# Patient Record
Sex: FEMALE | Race: AMERICAN INDIAN OR ALASKA NATIVE | HISPANIC OR LATINO
[De-identification: names, ages, dates, MRNs, and addresses within clinical notes are randomized per-mention and may not be internally consistent; named-entity substitution may affect disease eponyms.]

---
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
## 2023-04-13 ENCOUNTER — TELEPHONE (OUTPATIENT)
Dept: RHEUMATOLOGY | Facility: CLINIC | Age: 23
End: 2023-04-13

## 2023-04-13 NOTE — TELEPHONE ENCOUNTER
Patient called that to get medical records from [facility] office,  needs to send cover with patient's name and date of birth for request. Her and he sister have appt 4/21    Fax: [phone]

## 2023-04-17 ENCOUNTER — MED REC SCAN ONLY (OUTPATIENT)
Dept: RHEUMATOLOGY | Facility: CLINIC | Age: 23
End: 2023-04-17

## 2023-04-21 ENCOUNTER — OFFICE VISIT (OUTPATIENT)
Dept: RHEUMATOLOGY | Facility: CLINIC | Age: 23
End: 2023-04-21

## 2023-04-21 VITALS
RESPIRATION RATE: 16 BRPM | HEIGHT: 60 IN | BODY MASS INDEX: 30.9 KG/M2 | SYSTOLIC BLOOD PRESSURE: 125 MMHG | WEIGHT: 157.38 LBS | DIASTOLIC BLOOD PRESSURE: 86 MMHG | HEART RATE: 91 BPM

## 2023-04-21 DIAGNOSIS — J98.4 RESTRICTIVE LUNG DISEASE: ICD-10-CM

## 2023-04-21 DIAGNOSIS — Z51.81 THERAPEUTIC DRUG MONITORING: ICD-10-CM

## 2023-04-21 DIAGNOSIS — M32.9 SYSTEMIC LUPUS ERYTHEMATOSUS, UNSPECIFIED SLE TYPE, UNSPECIFIED ORGAN INVOLVEMENT STATUS (HCC): Primary | ICD-10-CM

## 2023-04-21 PROCEDURE — 99244 OFF/OP CNSLTJ NEW/EST MOD 40: CPT | Performed by: INTERNAL MEDICINE

## 2023-04-21 PROCEDURE — 3079F DIAST BP 80-89 MM HG: CPT | Performed by: INTERNAL MEDICINE

## 2023-04-21 PROCEDURE — 3008F BODY MASS INDEX DOCD: CPT | Performed by: INTERNAL MEDICINE

## 2023-04-21 PROCEDURE — 3074F SYST BP LT 130 MM HG: CPT | Performed by: INTERNAL MEDICINE

## 2023-04-21 RX ORDER — MYCOPHENOLATE MOFETIL 500 MG/1
1000 TABLET ORAL 2 TIMES DAILY
Qty: 120 TABLET | Refills: 3 | Status: SHIPPED | OUTPATIENT
Start: 2023-04-21

## 2023-04-21 NOTE — PATIENT INSTRUCTIONS
1. Check labs  2. Cont. hydroxychlroquine 300mg a day   3. Cont.m ycophenolate 1000mg twice a day   4. Cont. Prednisone. 2.5mg a day   5. Check spirometry   6. Eye exam 9/2023   7.  Return to clinic 1 month

## 2023-06-16 ENCOUNTER — OFFICE VISIT (OUTPATIENT)
Dept: RHEUMATOLOGY | Facility: CLINIC | Age: 23
End: 2023-06-16

## 2023-06-16 VITALS
DIASTOLIC BLOOD PRESSURE: 84 MMHG | SYSTOLIC BLOOD PRESSURE: 133 MMHG | HEART RATE: 101 BPM | BODY MASS INDEX: 31.45 KG/M2 | HEIGHT: 60 IN | RESPIRATION RATE: 16 BRPM | WEIGHT: 160.19 LBS

## 2023-06-16 DIAGNOSIS — Z51.81 THERAPEUTIC DRUG MONITORING: ICD-10-CM

## 2023-06-16 DIAGNOSIS — E55.9 VITAMIN D DEFICIENCY: ICD-10-CM

## 2023-06-16 DIAGNOSIS — M32.9 SYSTEMIC LUPUS ERYTHEMATOSUS, UNSPECIFIED SLE TYPE, UNSPECIFIED ORGAN INVOLVEMENT STATUS (HCC): Primary | ICD-10-CM

## 2023-06-16 PROCEDURE — 99214 OFFICE O/P EST MOD 30 MIN: CPT | Performed by: INTERNAL MEDICINE

## 2023-06-16 PROCEDURE — 3079F DIAST BP 80-89 MM HG: CPT | Performed by: INTERNAL MEDICINE

## 2023-06-16 PROCEDURE — 3008F BODY MASS INDEX DOCD: CPT | Performed by: INTERNAL MEDICINE

## 2023-06-16 PROCEDURE — 3075F SYST BP GE 130 - 139MM HG: CPT | Performed by: INTERNAL MEDICINE

## 2023-06-16 RX ORDER — HYDROXYCHLOROQUINE SULFATE 200 MG/1
200 TABLET, FILM COATED ORAL 2 TIMES DAILY
Qty: 180 TABLET | Refills: 1 | Status: SHIPPED | OUTPATIENT
Start: 2023-06-16

## 2023-06-16 RX ORDER — PREDNISONE 2.5 MG/1
5 TABLET ORAL DAILY
Qty: 180 TABLET | Refills: 1 | Status: SHIPPED | OUTPATIENT
Start: 2023-06-16

## 2023-06-16 RX ORDER — MYCOPHENOLATE MOFETIL 500 MG/1
TABLET ORAL
Qty: 450 TABLET | Refills: 1 | Status: SHIPPED | OUTPATIENT
Start: 2023-06-16

## 2023-06-16 NOTE — PATIENT INSTRUCTIONS
1. Check labs in 2 months -   2. Cont. hydroxychlroquine 300mg a day   3. increase. mycophenolate to 1500mg in am and 1000mg in am   4. increase Prednisone. 2.5 to 5mg a day x 1 month   5. Check spirometry  - go ahead and schedule it. 6. Eye exam 9/2023   7.  Return to clinic 2  month

## 2023-08-28 ENCOUNTER — OFFICE VISIT (OUTPATIENT)
Dept: RHEUMATOLOGY | Facility: CLINIC | Age: 23
End: 2023-08-28

## 2023-08-28 VITALS
WEIGHT: 153 LBS | BODY MASS INDEX: 30.04 KG/M2 | SYSTOLIC BLOOD PRESSURE: 135 MMHG | HEART RATE: 98 BPM | RESPIRATION RATE: 16 BRPM | DIASTOLIC BLOOD PRESSURE: 94 MMHG | HEIGHT: 60 IN

## 2023-08-28 DIAGNOSIS — Z51.81 THERAPEUTIC DRUG MONITORING: ICD-10-CM

## 2023-08-28 DIAGNOSIS — M32.9 SYSTEMIC LUPUS ERYTHEMATOSUS, UNSPECIFIED SLE TYPE, UNSPECIFIED ORGAN INVOLVEMENT STATUS (HCC): Primary | ICD-10-CM

## 2023-08-28 DIAGNOSIS — E55.9 VITAMIN D DEFICIENCY: ICD-10-CM

## 2023-08-28 PROCEDURE — 99214 OFFICE O/P EST MOD 30 MIN: CPT | Performed by: INTERNAL MEDICINE

## 2023-08-28 PROCEDURE — 3008F BODY MASS INDEX DOCD: CPT | Performed by: INTERNAL MEDICINE

## 2023-08-28 PROCEDURE — 3075F SYST BP GE 130 - 139MM HG: CPT | Performed by: INTERNAL MEDICINE

## 2023-08-28 PROCEDURE — 3080F DIAST BP >= 90 MM HG: CPT | Performed by: INTERNAL MEDICINE

## 2023-08-28 RX ORDER — MYCOPHENOLATE MOFETIL 500 MG/1
TABLET ORAL
Qty: 540 TABLET | Refills: 1 | Status: SHIPPED | OUTPATIENT
Start: 2023-08-28

## 2023-08-28 RX ORDER — ERGOCALCIFEROL 1.25 MG/1
50000 CAPSULE ORAL WEEKLY
Qty: 12 CAPSULE | Refills: 0 | Status: SHIPPED | OUTPATIENT
Start: 2023-08-28 | End: 2023-08-28

## 2023-08-28 RX ORDER — ERGOCALCIFEROL 1.25 MG/1
50000 CAPSULE ORAL WEEKLY
Qty: 12 CAPSULE | Refills: 0 | Status: SHIPPED | OUTPATIENT
Start: 2023-08-28 | End: 2023-11-26

## 2023-08-28 RX ORDER — PREDNISONE 10 MG/1
TABLET ORAL
Qty: 51 TABLET | Refills: 1 | Status: SHIPPED | OUTPATIENT
Start: 2023-08-28

## 2023-10-24 ENCOUNTER — MED REC SCAN ONLY (OUTPATIENT)
Dept: RHEUMATOLOGY | Facility: CLINIC | Age: 23
End: 2023-10-24

## 2023-10-25 ENCOUNTER — OFFICE VISIT (OUTPATIENT)
Dept: RHEUMATOLOGY | Facility: CLINIC | Age: 23
End: 2023-10-25
Payer: COMMERCIAL

## 2023-10-25 VITALS
BODY MASS INDEX: 28.53 KG/M2 | HEART RATE: 83 BPM | DIASTOLIC BLOOD PRESSURE: 88 MMHG | SYSTOLIC BLOOD PRESSURE: 144 MMHG | RESPIRATION RATE: 16 BRPM | WEIGHT: 145.31 LBS | HEIGHT: 60 IN

## 2023-10-25 DIAGNOSIS — Z51.81 THERAPEUTIC DRUG MONITORING: ICD-10-CM

## 2023-10-25 DIAGNOSIS — M32.9 SYSTEMIC LUPUS ERYTHEMATOSUS, UNSPECIFIED SLE TYPE, UNSPECIFIED ORGAN INVOLVEMENT STATUS (HCC): Primary | ICD-10-CM

## 2023-10-25 DIAGNOSIS — E55.9 VITAMIN D DEFICIENCY: ICD-10-CM

## 2023-10-25 PROCEDURE — 3077F SYST BP >= 140 MM HG: CPT | Performed by: INTERNAL MEDICINE

## 2023-10-25 PROCEDURE — 3079F DIAST BP 80-89 MM HG: CPT | Performed by: INTERNAL MEDICINE

## 2023-10-25 PROCEDURE — 3008F BODY MASS INDEX DOCD: CPT | Performed by: INTERNAL MEDICINE

## 2023-10-25 PROCEDURE — 99214 OFFICE O/P EST MOD 30 MIN: CPT | Performed by: INTERNAL MEDICINE

## 2023-10-25 RX ORDER — PREDNISONE 5 MG/1
5 TABLET ORAL DAILY
Qty: 90 TABLET | Refills: 1 | Status: SHIPPED | OUTPATIENT
Start: 2023-10-25

## 2023-10-25 RX ORDER — HYDROXYCHLOROQUINE SULFATE 200 MG/1
200 TABLET, FILM COATED ORAL 2 TIMES DAILY
Qty: 180 TABLET | Refills: 1 | Status: SHIPPED | OUTPATIENT
Start: 2023-10-25

## 2023-10-25 NOTE — PATIENT INSTRUCTIONS
1. Check labs in 3months. 2. Cont. hydroxychlroquine 300mg a day   3. Cont. mycophenolate to 1500mg twice a day -   4. Cont. Prednisone. 5mg adya   5. Check spirometry  - go ahead and schedule it. - call  [phone] - or [phone] to schedule  6. Eye exam1/2024   7.  Cont. ergocalciferol 50,000units a week x 12 weeks , then  vit d 2000units over the counter

## 2023-11-04 RX ORDER — PREDNISONE 10 MG/1
TABLET ORAL
Qty: 51 TABLET | Refills: 1 | OUTPATIENT
Start: 2023-11-04

## 2023-12-04 RX ORDER — ERGOCALCIFEROL 1.25 MG/1
50000 CAPSULE ORAL WEEKLY
Qty: 12 CAPSULE | Refills: 0 | Status: SHIPPED | OUTPATIENT
Start: 2023-12-04

## 2023-12-04 NOTE — TELEPHONE ENCOUNTER
LOV: 10/25/23  Last Refilled:#12, 0rfs 8/28/23    Future Appointments   Date Time Provider Izabela Cha   1/8/2024  9:15 AM Jaden Handy MD [facility]   1/30/2024  1:50 PM Gloria Bryant MD [facility]     Summary  1. Check labs in 3months. 2. Cont. hydroxychlroquine 300mg a day   3. Cont. mycophenolate to 1500mg twice a day -   4. Cont. Prednisone. 5mg adya   5. Check spirometry  - go ahead and schedule it. -   6. Eye exam 1/2024   7. Cont. ergocalciferol 50,000units a week x 12 weeks , then  vit d 2000units over the counter  -  Consider benlysta - or rituxan           Jasson Guerrier MD  10/25/2023   12:55 PM  - [place]      Please advise.

## 2024-02-19 RX ORDER — MYCOPHENOLATE MOFETIL 500 MG/1
TABLET ORAL
Qty: 540 TABLET | Refills: 1 | Status: SHIPPED | OUTPATIENT
Start: 2024-02-19

## 2024-02-19 NOTE — TELEPHONE ENCOUNTER
Requested Prescriptions     Pending Prescriptions Disp Refills    Mycophenolate Mofetil 500 MG Oral Tab [Pharmacy Med Name: MYCOPHENOLATE 500MG TABLETS] 540 tablet 1     Sig: TAKE 3 TABLETS BY MOUTH TWICE DAILY     LF:  LOV: 10/25/23   Future Appointments   Date Time Provider Department Center   5/13/2024 10:00 AM Fadia Maldonado MD [facility]     Labs:                     Summary  1. Check labs in 3months.    2. Cont. hydroxychlroquine 300mg a day   3. Cont. mycophenolate to 1500mg twice a day -   4. Cont. Prednisone. 5mg adya   5. Check spirometry  - go ahead and schedule it. -   6. Eye exam 1/2024   7. Cont. ergocalciferol 50,000units a week x 12 weeks , then  vit d 2000units over the counter  -  Consider benlysta - or rituxan           Fadia Maldonado MD  10/25/2023   12:55 PM  - [place]

## 2024-03-04 RX ORDER — ERGOCALCIFEROL 1.25 MG/1
50000 CAPSULE ORAL WEEKLY
Qty: 12 CAPSULE | Refills: 0 | Status: SHIPPED | OUTPATIENT
Start: 2024-03-04

## 2024-03-04 NOTE — TELEPHONE ENCOUNTER
LOV: 11/1/23  Last Refilled:#12, 0rfs 12/4/23    Summary  1. Check labs in 3months.    2. Cont. hydroxychlroquine 300mg a day   3. Cont. mycophenolate to 1500mg twice a day -   4. Cont. Prednisone. 5mg adya   5. Check spirometry  - go ahead and schedule it. -   6. Eye exam 1/2024   7. Cont. ergocalciferol 50,000units a week x 12 weeks , then  vit d 2000units over the counter  -  Consider benlysta - or rituxan           Fadia Maldonado MD  10/25/2023   12:55 PM  Please advise.

## 2024-03-05 RX ORDER — PREDNISONE 5 MG/1
5 TABLET ORAL DAILY
Qty: 90 TABLET | Refills: 1 | Status: SHIPPED | OUTPATIENT
Start: 2024-03-05

## 2024-03-05 NOTE — TELEPHONE ENCOUNTER
LOV: 11/1/23  Last Refilled:#90, 1rf 10/25/23    Summary  1. Check labs in 3months.    2. Cont. hydroxychlroquine 300mg a day   3. Cont. mycophenolate to 1500mg twice a day -   4. Cont. Prednisone. 5mg adya   5. Check spirometry  - go ahead and schedule it. -   6. Eye exam 1/2024   7. Cont. ergocalciferol 50,000units a week x 12 weeks , then  vit d 2000units over the counter  -  Consider benlysta - or rituxan           Fadia Maldonado MD  10/25/2023   Please advise.

## 2024-05-07 RX ORDER — PREDNISONE 5 MG/1
5 TABLET ORAL DAILY
Qty: 90 TABLET | Refills: 1 | OUTPATIENT
Start: 2024-05-07

## 2024-05-13 ENCOUNTER — OFFICE VISIT (OUTPATIENT)
Dept: RHEUMATOLOGY | Facility: CLINIC | Age: 24
End: 2024-05-13

## 2024-05-13 ENCOUNTER — LAB ENCOUNTER (OUTPATIENT)
Dept: LAB | Facility: HOSPITAL | Age: 24
End: 2024-05-13
Attending: INTERNAL MEDICINE

## 2024-05-13 VITALS
DIASTOLIC BLOOD PRESSURE: 85 MMHG | HEART RATE: 112 BPM | WEIGHT: 154.69 LBS | BODY MASS INDEX: 30.37 KG/M2 | HEIGHT: 60 IN | RESPIRATION RATE: 16 BRPM | SYSTOLIC BLOOD PRESSURE: 136 MMHG

## 2024-05-13 DIAGNOSIS — M32.9 SYSTEMIC LUPUS ERYTHEMATOSUS, UNSPECIFIED SLE TYPE, UNSPECIFIED ORGAN INVOLVEMENT STATUS (HCC): Primary | ICD-10-CM

## 2024-05-13 DIAGNOSIS — Z51.81 THERAPEUTIC DRUG MONITORING: ICD-10-CM

## 2024-05-13 LAB
ALBUMIN SERPL-MCNC: 4 G/DL (ref 3.2–4.8)
ALBUMIN/GLOB SERPL: 1 {RATIO} (ref 1–2)
ALP LIVER SERPL-CCNC: 77 U/L
ALT SERPL-CCNC: 20 U/L
ANION GAP SERPL CALC-SCNC: 8 MMOL/L (ref 0–18)
AST SERPL-CCNC: 25 U/L (ref ?–34)
BILIRUB SERPL-MCNC: 0.7 MG/DL (ref 0.3–1.2)
BUN BLD-MCNC: 13 MG/DL (ref 9–23)
BUN/CREAT SERPL: 21.3 (ref 10–20)
CALCIUM BLD-MCNC: 9.6 MG/DL (ref 8.7–10.4)
CHLORIDE SERPL-SCNC: 108 MMOL/L (ref 98–112)
CO2 SERPL-SCNC: 24 MMOL/L (ref 21–32)
CREAT BLD-MCNC: 0.61 MG/DL
CRP SERPL-MCNC: <0.4 MG/DL (ref ?–1)
DEPRECATED RDW RBC AUTO: 39.3 FL (ref 35.1–46.3)
EGFRCR SERPLBLD CKD-EPI 2021: 130 ML/MIN/1.73M2 (ref 60–?)
ERYTHROCYTE [DISTWIDTH] IN BLOOD BY AUTOMATED COUNT: 13.2 % (ref 11–15)
ERYTHROCYTE [SEDIMENTATION RATE] IN BLOOD: 50 MM/HR
FASTING STATUS PATIENT QL REPORTED: YES
GLOBULIN PLAS-MCNC: 3.9 G/DL (ref 2–3.5)
GLUCOSE BLD-MCNC: 88 MG/DL (ref 70–99)
HCT VFR BLD AUTO: 37.1 %
HGB BLD-MCNC: 12 G/DL
MCH RBC QN AUTO: 26.7 PG (ref 26–34)
MCHC RBC AUTO-ENTMCNC: 32.3 G/DL (ref 31–37)
MCV RBC AUTO: 82.4 FL
OSMOLALITY SERPL CALC.SUM OF ELEC: 290 MOSM/KG (ref 275–295)
PLATELET # BLD AUTO: 185 10(3)UL (ref 150–450)
POTASSIUM SERPL-SCNC: 4.2 MMOL/L (ref 3.5–5.1)
PROT SERPL-MCNC: 7.9 G/DL (ref 5.7–8.2)
RBC # BLD AUTO: 4.5 X10(6)UL
SODIUM SERPL-SCNC: 140 MMOL/L (ref 136–145)
WBC # BLD AUTO: 4.4 X10(3) UL (ref 4–11)

## 2024-05-13 PROCEDURE — 3079F DIAST BP 80-89 MM HG: CPT | Performed by: INTERNAL MEDICINE

## 2024-05-13 PROCEDURE — 86140 C-REACTIVE PROTEIN: CPT | Performed by: INTERNAL MEDICINE

## 2024-05-13 PROCEDURE — 85652 RBC SED RATE AUTOMATED: CPT | Performed by: INTERNAL MEDICINE

## 2024-05-13 PROCEDURE — 99214 OFFICE O/P EST MOD 30 MIN: CPT | Performed by: INTERNAL MEDICINE

## 2024-05-13 PROCEDURE — 80053 COMPREHEN METABOLIC PANEL: CPT | Performed by: INTERNAL MEDICINE

## 2024-05-13 PROCEDURE — 3008F BODY MASS INDEX DOCD: CPT | Performed by: INTERNAL MEDICINE

## 2024-05-13 PROCEDURE — 3075F SYST BP GE 130 - 139MM HG: CPT | Performed by: INTERNAL MEDICINE

## 2024-05-13 PROCEDURE — 36415 COLL VENOUS BLD VENIPUNCTURE: CPT | Performed by: INTERNAL MEDICINE

## 2024-05-13 PROCEDURE — 86225 DNA ANTIBODY NATIVE: CPT

## 2024-05-13 PROCEDURE — 85027 COMPLETE CBC AUTOMATED: CPT | Performed by: INTERNAL MEDICINE

## 2024-05-13 NOTE — PROGRESS NOTES
Riana Rivera is a 23 year old female who presents for   Chief Complaint   Patient presents with    SLE    Medication Follow-Up   .   HPI:     I had the pleasure of seeing Riana Rivera on 4/21/2023 for evaluation.     She is a pleasant 22 year old who has a hx of SLE and chronic ITP. . She has a history of lupus associated with restrictive lung disease with a decreased DLCO, as well as mild renal involvement. She has . mild lupus GN, mild restrictive lung disease, and a h/o high + APLA but no h/o thrombosis. Her serology includes Lower complements.  - positive LAC, anti cardiolipin ig G and M, pos b2 GP Ig , ALFRED pos 1:!280 speckled, ds DNA pos, and hx of chronic ITP.   At age 11 years she was dx with thrombocytopenia This was ITP. In 5/2012  she had bruises, bleeding gumss, petechial rashs over her bodyShe was treated with IVIG, but had recurrence of the thrombocytopenia and was hospitalized ~ 4 times for this problem. She did receive 3 days steorids and IVIG  from 11/11/2014 to 11/15/2014. Then repeat IVIG  on 12/1/2014.  She also was treated also with oral prednisone. During a hospitalization in April 2014, she was worked up for lupus and studies revealed a high + ALFRED and dsDNA. Her parents report that she received IV methylprednisolone weekly for 5 weeks and IVIG.  She then was treated with PO prednisone for awhile. Per her parents, the prednisone was discontinued > 1 year ago without recurrence of the thrombocytopenia.       She was put on cellcept 250mg bid and prednisone in 2015 for mild protienurine - this resolved while seeing pediatric nephrology and was stopped in 2015. In 7/26/2017 she had a kidney biopsy . 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 She was admitted in 11/2017 with pericaridal effusion and then again in 11-12/2017 for mild pericardial and pleural effusion. She had anemia a that time thought to be mulitfactorial.    She had done ekg's/echos all the time.     She was  on pred 5mg a day - - now on 2.5mg a day - 2 days -   She was up to 20mg a day around march and now tapered back down.- her platetles were low - so then gradually went down.   This is the first time she had thormbcytopenia  Again in a while.     Gets joint pain at times - , fibromyalgia as well ,   latetly no joint pain.   She is Taking baby asa , MMF 500mg bid - hcq 300mg a day -     Her next Eye exam in 9/2023 - she gets them yearly.   She lives with her twin sister in her own town house. 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   She has mild joint pain in her feet and knees.and hips.   No rashes.   No headaches.   No swellign of her legs.   She is not getting enougth mycheonolate - she is not missing any doses.     Her ESR nicreased to 40mm/hr. , her ds dnad is 288 - positive now.     8/28/2023  After her brithday - she had a huge flare up - she has dtrouble doing anything - her bones, and joints were really sore.   She still feels flare - her eyes are puffy and then her feet are swelling.   Her joint pain is mostly in her fingers , hips and knees.   It can get to 7/10   This weekend her eyes were very puffy.   She was getting low grade fevers.     In her flare - she had trouble with her throat - couldn't swallow - sore thorat. Nad voice affected.     10/25/2023  She is feeling better.   Ds dna > 300---> 106   ESR was 53mm/hr ---> 22mm/hr.   She feels the joitn pain is better.   She has mild headache. This is her usual with humiditly. 2/10 headache.   No joint pain now.   Her breathing is fine. She has to still schedule her pfts.   She tapered the prednsione from 30mg a day - and around 10mg - she had to go to 15mg a day and now she is on 5mg aday  She is doing better on this   Her mycophenolate is better as well 1500mg bid.   Eye exam in January 5/13/2024  She has been feeling  fine but she feels a slight flare up. Her left thumb is red and she feels a bad hangnail  she feels nerves in her left thumb. She increased her prednisone to 7.5mg a day and that is helping. She just started doing it last week on Wednesday. Everything else feels fine.   Her left thumb is slightly red.       Wt Readings from Last 2 Encounters:   05/13/24 154 lb 11.2 oz (70.2 kg)   10/25/23 145 lb 4.8 oz (65.9 kg)     Body mass index is 30.21 kg/m².      Current Outpatient Medications   Medication Sig Dispense Refill    predniSONE 5 MG Oral Tab Take 1 tablet (5 mg total) by mouth daily. 90 tablet 1    ergocalciferol 1.25 MG (29914 UT) Oral Cap Take 1 capsule (50,000 Units total) by mouth once a week. 12 capsule 0    Mycophenolate Mofetil 500 MG Oral Tab TAKE 3 TABLETS BY MOUTH TWICE DAILY 540 tablet 1    hydroxychloroquine 200 MG Oral Tab Take 1 tablet (200 mg total) by mouth 2 (two) times daily. 180 tablet 1    aspirin 81 MG Oral Chew Tab       predniSONE 2.5 MG Oral Tab Take 2 tablets (5 mg total) by mouth daily. (Patient not taking: Reported on 10/25/2023) 180 tablet 1    Cholecalciferol ([employer] D3) 2000 UNITS Oral Cap Take 1 capsule by mouth once daily. (Patient not taking: Reported on 5/13/2024) 30 capsule 1      Past Medical History:    Exophoria    ITP (idiopathic thrombocytopenic purpura)    Long-term use of Plaquenil    Lupus (HCC)    Myopia of both eyes with astigmatism      History reviewed. No pertinent surgical history.   Family History   Problem Relation Age of Onset    Hypertension Mother     Other (Other) Mother     Other (obesity) Mother     Other (rheumatic fever) Mother     Diabetes Maternal Grandmother     Other (arthritis) Maternal Grandmother     Other (psoriasis) Maternal Aunt     Other (vitiligo) Maternal Aunt     Other (thyroid disease) Maternal Aunt     Glaucoma Neg     Macular degeneration Neg       Social History:  Social History     Socioeconomic History    Marital status: Single   Tobacco  Use    Smoking status: Never    Smokeless tobacco: Never   Substance and Sexual Activity    Alcohol use: No    Drug use: No      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   Lives in own town house -   3 sisters - one twin with lupus      REVIEW OF SYSTEMS:   Review Of Systems:  Fatigue  Constitutional:No fever, no change in weight or appetitie  Derm: No rashes, no oral ulcers, no alopecia, no photosensitivity, no psoriasis  HEENT: No dry eyes, no dry mouth, no Raynaud's, no nasal ulcers, no parotid swelling, no neck pain, no jaw pain, no temple pain  Eyes: No visual changes,   CVS: No chest pain, no heart disease, hx of pericardial effusion - with flares gets pericarditis -   RS: No SOB, no Cough, No Pleurtic pain,  Hx of RLD - has some sob - anxiety with this - no BARNETT,   GI: No nausea, no vomiiting, no abominal pain, no hx of ulcer, no gastritis, no heartburn, no dyshpagia, no BRBPR or melena  : no dysuria, no hx of miscarriages, no DVT Hx, no hx of OCP,   Neuro: No numbness or tingling, no headache, no hx of seizures,   Psych: no hx of anxiety or depression  ENDO: no hx of thyroid disease, no hx of DM  Joint/Muscluskeltal: see HPI,   All other ROS are negative.     EXAM:   /85   Pulse 112   Resp 16   Ht 5' (1.524 m)   Wt 154 lb 11.2 oz (70.2 kg)   BMI 30.21 kg/m²   HEENT: Clear oropharynx, no oral ulcers, EOM intact, clear sclear, PERRLA, pleasant, no acute distress, no CAD,   No rashes  CVS: RRR, no murmurs  RS: CTAB, no crackles, no rhonchi  ABD: Soft Non tender, no HSM fel  Joint exam:   no neck tendnerness, good ROM,   No  joint tenderness in b/l 105th mcps and pips and b/l wrists  Left thumb slightly red at tip  Improved right 3rd pip   Not Tender to palpiaton in toes - squeeze test positive   No rash es  No edema  EXTREMITIES: no cyanosis, clubbing or edema  NEURO: intact touch, 5/5 ue and le strength    7/26/2017 - renal biopsy  A, B \T\ C: Left kidney, biopsy:   - Mesangial proliferative lupus nephritis  (lupus nephritis, class II), see   note   - Mild chronic tubulointerstitial changes   =    6/10/2023   Wbc is 5.2, hb is 9.8, plt are 180  Esr is 40mm/hr.   ua is normal, trace blood no protein  c3 is 23  c4 is <8  CRP is 6.0mg/L  Ck is 52,   cmp - 0.67, ast is 14, alt is 9, total protein is 3.0  Microablumin/creatine 32  rf is <10  Ds dna is 288 - elevated    8/28/2023  ESR is 53mm/hr.   Crp is 7.5mg/L  C3 is 25 - L  C4 is <8 - L  Vit d 25 oh level is 18.5  Ds dna is > 300  Wbc is 4.6, hb is 9.1 plt are 148  Cmp - cr is 0.6, ast is 21 alt is 15  Microalbumin/cr ration is 101 -   Ua is showing 70 protein , RBC is 3-4    10/23/2023   10/23/23 12:31 PM    Sodium  133 - 146 mmol/L 134   Potassium  3.5 - 5.1 mmol/L 4.1   Chloride  98 - 107 mmol/L 99   Carbon Dioxide  21 - 31 mmol/L 26   Anion Gap  4 - 13 mmol/L 9   Blood Urea Nitrogen  7 - 25 mg/dL 8   Creatinine  0.60 - 1.30 mg/dL 0.58 Low    eGFRcr (CKD-EPI 2021)  >=60 mL/min/1.73 m² >90   Calcium  8.3 - 10.5 mg/dL 9.2   Glucose  70 - 100 mg/dL 85   Protein, Total  6.4 - 8.3 g/dL 7.3   Albumin  3.5 - 5.0 g/dL 3.9   ALT  9 - 43 units/L 13   Alkaline Phosphatase  34 - 104 units/L 65   AST  13 - 39 units/L 19   Bilirubin, Total  0.2 - 1.2 mg/dL 0.6       10/23/23 12:31 PM    C-Reactive Protein  0.0 - 10.0 mg/L 3.3     10/23/23 12:31 PM    Sedimentation Rate  (Based on documented legal sex) 0-20 mm/Hour 22 High      10/23/23 12:31 PM    Complement C3, Serum  87 - 200 mg/dL 65 Low      10/23/23 12:31 PM    WBC  3.6 - 10.2 10ˆ3/µL 7.0   RBC  (Based on documented legal sex) 4.10-5.30 10ˆ6/µL 4.33   HGB  (Based on documented legal sex) 11.9-15.8 g/dL 10.8 Low    HCT  (Based on documented legal sex) 37.4-48.3 % 35.2 Low    MCV  82.0 - 99.0 fL 81.3 Low    MCH  27.0 - 33.0 pg 24.9 Low    MCHC  32.0 - 36.0 g/dL 30.7 Low    RDW  11.0 - 15.0 % 17.2 High    PLT  150 - 450 10ˆ3/µL 387     10/23/23 12:31 PM    CK, Total  26 - 192 units/L 13 Low      10/23/23 12:31 PM    Color, Urine  Light Yellow   Clarity, Urine Clear   Specific Gravity, Urine  1.005 - 1.035 1.010   pH, Urine  5.0 - 7.0 6.0   Protein, UA  Negative, 10-20 mg/dL Negative   Glucose, Urine  Negative mg/dL Normal   Ketones, Urine  Negative mg/dL Negative   Bilirubin, Urine  Negative Negative   Blood, Urine  Negative Negative   Nitrite, Urine  Negative Negative   Leukocyte Esterase, Urine  Negative Roderick/uL 500 Abnormal    Urobilinogen, Urine  Normal, <2.0 mg/dL Normal   RBC, Urine  None, 0-2 /hpf 0-2   WBC, Urine  None, 0-5 /hpf 6-9 Abnormal    Squamous Epithelial Cells, Urine  None /hpf Few Abnormal    Bacteria, Urine  None /hpf None   Hyaline Cast, Urine  None, 0-2 /lpf None   Resulting Agency [facility]     10/23/23 12:31 PM    dsDNA Antibody  0 - 4 IU/mL 106 High    dsDNA Antibody Interpretation  Negative Positive Abnormal        10/23/23 12:31 PM    Complement C4, Serum  19 - 52 mg/dL <8 Low        ASSESSMENT AND PLAN:   Riana Rivera is a 23 year old female who presents for   Chief Complaint   Patient presents with    SLE    Medication Follow-Up     1. SLE  - with RLD, mhx of mild nephritis, hx of apls antibodies, hx of ds dna and low C3 and C4 ,  positive LAC, anti cardiolipin ig G and M, pos b2 GP Ig   Recent thrombocytopenia 72 k --> 180k---> 148 k  Hb is 9.1 -   - finsihed steroids burst - was on pred 20mg - tapered over sevral weeks and now on pred 5mg adayu   - ds dna and ESR is improved now   Hx of ITP - will need to montior - currently plta is 180k - stable       Cont. hcq 300mg a day   B/c ESR and ds dna is elevated   - cont.  mycnphonelate to 1500mg in am and 1500mg in pm - doing much better on thsi.   - s/p  pred to 30mg x  1 week, then 20mg x 1 week, 10mg a weeks --> now on pred 5mg a day   - get PFTS]  - d/w her to consider benlysta or rituxan - info given on both   - she's not keen on being on steroids for along time.   rtc in 3 month -     Eye exam due in 9/2023 -     2. Vit d def - start ergocalciferol 50,000units  a week x 12 weeks , then  vit d 2000units over the counter        Summary  1. Check labs in 3months.    2. Cont. hydroxychlroquine 300mg a day   3. Cont. Mycophenolate - three tablets in the monring and 2 at night  1500mg in am and 1000mg in pm    4. Cont. Prednisone. 7.5mg adya - go back to 5mg a day   5. Check spirometry  - go ahead and schedule it. -   6. Eye exam 1/2024   7. Cont. ergocalciferol 50,000units a week x 12 weeks , then  vit d 2000units over the counter  8. August 12 at 9 am   -  Consider benlysta - or remberto Maldonado MD  5/13/2024   10:40 AM       is applicable because the patient's medical record notes reflects the ongoing nature of the continuous longitudinal relationship of care, and the medical record indicates that there is ongoing treatment of a serious/complex medical condition.

## 2024-05-13 NOTE — PATIENT INSTRUCTIONS
1. Check labs in 3months.    2. Cont. hydroxychlroquine 300mg a day   3. Cont. Mycophenolate - three tablets in the monring and 2 at night  1500mg in am and 1000mg in pm    4. Cont. Prednisone. 7.5mg adya - go back to 5mg a day   5. Check spirometry  - go ahead and schedule it. -   6. Eye exam 1/2024   7. Cont. ergocalciferol 50,000units a week x 12 weeks , then  vit d 2000units over the counter  8. August 12 at 9 am

## 2024-05-14 LAB — DSDNA IGG SERPL IA-ACNC: 57.2 IU/ML

## 2024-05-28 NOTE — TELEPHONE ENCOUNTER
LOV: 5/13/24  Last Refilled:#12, 0rfs 3/4/24  Labs:  Summary  1. Check labs in 3months.    2. Cont. hydroxychlroquine 300mg a day   3. Cont. Mycophenolate - three tablets in the monring and 2 at night  1500mg in am and 1000mg in pm    4. Cont. Prednisone. 7.5mg adya - go back to 5mg a day   5. Check spirometry  - go ahead and schedule it. -   6. Eye exam 1/2024   7. Cont. ergocalciferol 50,000units a week x 12 weeks , then  vit d 2000units over the counter  8. August 12 at 9 am   -  Consider benlysta - or remberto Maldonado MD  5/13/2024   10:40 A  Please advise.

## 2024-05-29 RX ORDER — ERGOCALCIFEROL 1.25 MG/1
50000 CAPSULE ORAL WEEKLY
Qty: 12 CAPSULE | Refills: 0 | Status: SHIPPED | OUTPATIENT
Start: 2024-05-29

## 2024-07-01 RX ORDER — ERGOCALCIFEROL 1.25 MG/1
50000 CAPSULE ORAL WEEKLY
Qty: 12 CAPSULE | Refills: 0 | OUTPATIENT
Start: 2024-07-01

## 2025-01-13 NOTE — TELEPHONE ENCOUNTER
LOV: 5/13/24  No future appointments.    Summary  1. Check labs in 3months.    2. Cont. hydroxychlroquine 300mg a day   3. Cont. Mycophenolate - three tablets in the monring and 2 at night  1500mg in am and 1000mg in pm    4. Cont. Prednisone. 7.5mg adya - go back to 5mg a day   5. Check spirometry  - go ahead and schedule it. -   6. Eye exam 1/2024   7. Cont. ergocalciferol 50,000units a week x 12 weeks , then  vit d 2000units over the counter  8. August 12 at 9 am   -  Consider benlysta - or remberto Maldonado MD  5/13/2024   10:40 AM         is applicable because the

## 2025-01-15 RX ORDER — PREDNISONE 5 MG/1
5 TABLET ORAL DAILY
Qty: 90 TABLET | Refills: 1 | Status: SHIPPED | OUTPATIENT
Start: 2025-01-15

## 2025-01-30 ENCOUNTER — TELEPHONE (OUTPATIENT)
Dept: RHEUMATOLOGY | Facility: CLINIC | Age: 25
End: 2025-01-30

## 2025-01-30 DIAGNOSIS — M32.9 SYSTEMIC LUPUS ERYTHEMATOSUS, UNSPECIFIED SLE TYPE, UNSPECIFIED ORGAN INVOLVEMENT STATUS (HCC): Primary | ICD-10-CM

## 2025-01-30 DIAGNOSIS — Z51.81 THERAPEUTIC DRUG MONITORING: ICD-10-CM

## 2025-01-30 NOTE — TELEPHONE ENCOUNTER
Sister, states that the patient has 2 ovarian cyst one on each side. Sister, states that the patient is in excruciating hand pain. Sister, states that the patient also pain on the foot that goes to the calf area.

## 2025-01-30 NOTE — TELEPHONE ENCOUNTER
In last 2 weeks extreme abdominal pain had ER visit had scans found 2 ovarian cysts. Patient started to have a flare up about 3 weeks ago that started in her hands. They are currently has red welts/marks on palm of hands and look inflamed. Today she started to have constant aching pain in foot to the calf area. She has tried tylenol but it only works 30 min-1 hr. She upped her Prednisone from 5 mg to 25 mg with some relief.   Recent armpit cyst that was removed and was given hydrocodone that she hadn't used and has now tried that for this pain with relief for a few hours and then it comes back. No fever or chills. Please advise

## 2025-01-30 NOTE — TELEPHONE ENCOUNTER
Please let pt. Know Will send in prednisone 60mg burst over 6 days, then stay on 10mg x 1 month -  Pt. Needs labs and appointment  If pain is severe she will need to goto the ER   Ordered her labs   Please help her make appt.

## 2025-01-30 NOTE — TELEPHONE ENCOUNTER
Called patient to give providers instructions. Patient verbalized understanding and notified this RN that she is currently in the ER due to increased pain in her calf

## 2025-02-09 ENCOUNTER — APPOINTMENT (OUTPATIENT)
Dept: URGENT CARE | Age: 25
End: 2025-02-09

## 2025-03-03 ENCOUNTER — OFFICE VISIT (OUTPATIENT)
Dept: RHEUMATOLOGY | Facility: CLINIC | Age: 25
End: 2025-03-03
Payer: COMMERCIAL

## 2025-03-03 ENCOUNTER — TELEPHONE (OUTPATIENT)
Dept: RHEUMATOLOGY | Facility: CLINIC | Age: 25
End: 2025-03-03

## 2025-03-03 ENCOUNTER — LAB ENCOUNTER (OUTPATIENT)
Dept: LAB | Facility: HOSPITAL | Age: 25
End: 2025-03-03
Attending: INTERNAL MEDICINE
Payer: COMMERCIAL

## 2025-03-03 VITALS
SYSTOLIC BLOOD PRESSURE: 172 MMHG | RESPIRATION RATE: 16 BRPM | HEIGHT: 60 IN | WEIGHT: 145.81 LBS | DIASTOLIC BLOOD PRESSURE: 113 MMHG | BODY MASS INDEX: 28.63 KG/M2 | HEART RATE: 96 BPM

## 2025-03-03 DIAGNOSIS — M32.9 SYSTEMIC LUPUS ERYTHEMATOSUS, UNSPECIFIED SLE TYPE, UNSPECIFIED ORGAN INVOLVEMENT STATUS (HCC): Primary | ICD-10-CM

## 2025-03-03 DIAGNOSIS — Z51.81 ENCOUNTER FOR THERAPEUTIC DRUG MONITORING: ICD-10-CM

## 2025-03-03 DIAGNOSIS — M32.9 SYSTEMIC LUPUS ERYTHEMATOSUS (HCC): Primary | ICD-10-CM

## 2025-03-03 DIAGNOSIS — Z51.81 THERAPEUTIC DRUG MONITORING: ICD-10-CM

## 2025-03-03 LAB
ALBUMIN SERPL-MCNC: 2.6 G/DL (ref 3.2–4.8)
ALBUMIN/GLOB SERPL: 0.8 {RATIO} (ref 1–2)
ALP LIVER SERPL-CCNC: 85 U/L
ALT SERPL-CCNC: 12 U/L
ANION GAP SERPL CALC-SCNC: 7 MMOL/L (ref 0–18)
AST SERPL-CCNC: 29 U/L (ref ?–34)
BILIRUB SERPL-MCNC: 0.4 MG/DL (ref 0.3–1.2)
BILIRUB UR QL: NEGATIVE
BUN BLD-MCNC: 10 MG/DL (ref 9–23)
BUN/CREAT SERPL: 15.2 (ref 10–20)
C3 SERPL-MCNC: 48 MG/DL (ref 82–160)
C4 SERPL-MCNC: 1.9 MG/DL (ref 12–36)
CALCIUM BLD-MCNC: 8.1 MG/DL (ref 8.7–10.4)
CHLORIDE SERPL-SCNC: 109 MMOL/L (ref 98–112)
CK SERPL-CCNC: <15 U/L
CO2 SERPL-SCNC: 26 MMOL/L (ref 21–32)
COLOR UR: YELLOW
CREAT BLD-MCNC: 0.66 MG/DL
CREAT UR-SCNC: 138.8 MG/DL
CRP SERPL-MCNC: 2.4 MG/DL (ref ?–1)
DEPRECATED RDW RBC AUTO: 45.1 FL (ref 35.1–46.3)
EGFRCR SERPLBLD CKD-EPI 2021: 127 ML/MIN/1.73M2 (ref 60–?)
ERYTHROCYTE [DISTWIDTH] IN BLOOD BY AUTOMATED COUNT: 14.8 % (ref 11–15)
ERYTHROCYTE [SEDIMENTATION RATE] IN BLOOD: 47 MM/HR
FASTING STATUS PATIENT QL REPORTED: NO
GLOBULIN PLAS-MCNC: 3.4 G/DL (ref 2–3.5)
GLUCOSE BLD-MCNC: 95 MG/DL (ref 70–99)
GLUCOSE UR-MCNC: NORMAL MG/DL
HCT VFR BLD AUTO: 29.2 %
HGB BLD-MCNC: 9.7 G/DL
HYALINE CASTS #/AREA URNS AUTO: PRESENT /LPF
KETONES UR-MCNC: NEGATIVE MG/DL
LEUKOCYTE ESTERASE UR QL STRIP.AUTO: 25
MCH RBC QN AUTO: 28 PG (ref 26–34)
MCHC RBC AUTO-ENTMCNC: 33.2 G/DL (ref 31–37)
MCV RBC AUTO: 84.4 FL
MICROALBUMIN UR-MCNC: >380 MG/DL
MIXED CELL CASTS #/AREA URNS LPF: PRESENT /LPF
NITRITE UR QL STRIP.AUTO: NEGATIVE
OSMOLALITY SERPL CALC.SUM OF ELEC: 293 MOSM/KG (ref 275–295)
PH UR: 7 [PH] (ref 5–8)
PLATELET # BLD AUTO: 153 10(3)UL (ref 150–450)
POTASSIUM SERPL-SCNC: 3.8 MMOL/L (ref 3.5–5.1)
PROT SERPL-MCNC: 6 G/DL (ref 5.7–8.2)
PROT UR-MCNC: 300 MG/DL
RBC # BLD AUTO: 3.46 X10(6)UL
RBC #/AREA URNS AUTO: >10 /HPF
SODIUM SERPL-SCNC: 142 MMOL/L (ref 136–145)
SP GR UR STRIP: 1.02 (ref 1–1.03)
UROBILINOGEN UR STRIP-ACNC: NORMAL
WBC # BLD AUTO: 5.9 X10(3) UL (ref 4–11)

## 2025-03-03 PROCEDURE — 86160 COMPLEMENT ANTIGEN: CPT | Performed by: INTERNAL MEDICINE

## 2025-03-03 PROCEDURE — 81001 URINALYSIS AUTO W/SCOPE: CPT | Performed by: INTERNAL MEDICINE

## 2025-03-03 PROCEDURE — 86225 DNA ANTIBODY NATIVE: CPT

## 2025-03-03 PROCEDURE — 86140 C-REACTIVE PROTEIN: CPT | Performed by: INTERNAL MEDICINE

## 2025-03-03 PROCEDURE — 82550 ASSAY OF CK (CPK): CPT | Performed by: INTERNAL MEDICINE

## 2025-03-03 PROCEDURE — 82570 ASSAY OF URINE CREATININE: CPT | Performed by: INTERNAL MEDICINE

## 2025-03-03 PROCEDURE — 85652 RBC SED RATE AUTOMATED: CPT | Performed by: INTERNAL MEDICINE

## 2025-03-03 PROCEDURE — 36415 COLL VENOUS BLD VENIPUNCTURE: CPT | Performed by: INTERNAL MEDICINE

## 2025-03-03 PROCEDURE — 80053 COMPREHEN METABOLIC PANEL: CPT | Performed by: INTERNAL MEDICINE

## 2025-03-03 PROCEDURE — 85027 COMPLETE CBC AUTOMATED: CPT | Performed by: INTERNAL MEDICINE

## 2025-03-03 PROCEDURE — 99214 OFFICE O/P EST MOD 30 MIN: CPT | Performed by: INTERNAL MEDICINE

## 2025-03-03 PROCEDURE — 82043 UR ALBUMIN QUANTITATIVE: CPT | Performed by: INTERNAL MEDICINE

## 2025-03-03 RX ORDER — HYDROXYCHLOROQUINE SULFATE 200 MG/1
TABLET, FILM COATED ORAL
Qty: 135 TABLET | Refills: 3 | Status: SHIPPED | OUTPATIENT
Start: 2025-03-03

## 2025-03-03 NOTE — PATIENT INSTRUCTIONS
Summary  1. Check labs in 3months.    2. Cont. hydroxychlroquine 300mg a day   3. Cont. Mycophenolate - three tablets in the monring and 2 at night  1500mg in am and 1000mg in pm    4. Cont. Prednisone. 5mg a day   5. Make appt with derm   6. Return to clinic in 3 months. 4/7 at 12:40 pm   7. Due for eye exam -

## 2025-03-03 NOTE — PROGRESS NOTES
Riana Rivera is a 24 year old female who presents for   Chief Complaint   Patient presents with    SLE    Medication Follow-Up   .   HPI:     I had the pleasure of seeing Riana Rivera on 4/21/2023 for evaluation.     She is a pleasant 22 year old who has a hx of SLE and chronic ITP. . She has a history of lupus associated with restrictive lung disease with a decreased DLCO, as well as mild renal involvement. She has . mild lupus GN, mild restrictive lung disease, and a h/o high + APLA but no h/o thrombosis. Her serology includes Lower complements.  - positive LAC, anti cardiolipin ig G and M, pos b2 GP Ig , ALFRED pos 1:!280 speckled, ds DNA pos, and hx of chronic ITP.   At age 11 years she was dx with thrombocytopenia This was ITP. In 5/2012  she had bruises, bleeding gumss, petechial rashs over her bodyShe was treated with IVIG, but had recurrence of the thrombocytopenia and was hospitalized ~ 4 times for this problem. She did receive 3 days steorids and IVIG  from 11/11/2014 to 11/15/2014. Then repeat IVIG  on 12/1/2014.  She also was treated also with oral prednisone. During a hospitalization in April 2014, she was worked up for lupus and studies revealed a high + ALFRED and dsDNA. Her parents report that she received IV methylprednisolone weekly for 5 weeks and IVIG.  She then was treated with PO prednisone for awhile. Per her parents, the prednisone was discontinued > 1 year ago without recurrence of the thrombocytopenia.       She was put on cellcept 250mg bid and prednisone in 2015 for mild protienurine - this resolved while seeing pediatric nephrology and was stopped in 2015. In 7/26/2017 she had a kidney biopsy . 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 She was admitted in 11/2017 with pericaridal effusion and then again in 11-12/2017 for mild pericardial and pleural effusion. She had anemia a that time thought to be mulitfactorial.    She had done ekg's/echos all the time.     She was  on pred 5mg a day - - now on 2.5mg a day - 2 days -   She was up to 20mg a day around march and now tapered back down.- her platetles were low - so then gradually went down.   This is the first time she had thormbcytopenia  Again in a while.     Gets joint pain at times - , fibromyalgia as well ,   latetly no joint pain.   She is Taking baby asa , MMF 500mg bid - hcq 300mg a day -     Her next Eye exam in 9/2023 - she gets them yearly.   She lives with her twin sister in her own town house. 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   She has mild joint pain in her feet and knees.and hips.   No rashes.   No headaches.   No swellign of her legs.   She is not getting enougth mycheonolate - she is not missing any doses.     Her ESR nicreased to 40mm/hr. , her ds dnad is 288 - positive now.     8/28/2023  After her brithday - she had a huge flare up - she has dtrouble doing anything - her bones, and joints were really sore.   She still feels flare - her eyes are puffy and then her feet are swelling.   Her joint pain is mostly in her fingers , hips and knees.   It can get to 7/10   This weekend her eyes were very puffy.   She was getting low grade fevers.     In her flare - she had trouble with her throat - couldn't swallow - sore thorat. Nad voice affected.     10/25/2023  She is feeling better.   Ds dna > 300---> 106   ESR was 53mm/hr ---> 22mm/hr.   She feels the joitn pain is better.   She has mild headache. This is her usual with humiditly. 2/10 headache.   No joint pain now.   Her breathing is fine. She has to still schedule her pfts.   She tapered the prednsione from 30mg a day - and around 10mg - she had to go to 15mg a day and now she is on 5mg aday  She is doing better on this   Her mycophenolate is better as well 1500mg bid.   Eye exam in January 5/13/2024  She has been feeling  fine but she feels a slight flare up. Her left thumb is red and she feels a bad hangnail  she feels nerves in her left thumb. She increased her prednisone to 7.5mg a day and that is helping. She just started doing it last week on Wednesday. Everything else feels fine.   Her left thumb is slightly red.     3/3/2025  She had an underarm cyst - in 12/2024 - and had gone off mycophenolate and prednisone and took abx   But she was flared with her lupus after that.   She was feeling terrible - and on top of that she had two ovarian cysts   Had abd palin wnet ot ht ER on 1/17/2025 -   Started having flare at beg of jan -   She had bad pains - throughout , leg swelling,   Her hands were also burning and on fire in both her hands - all over - had to put her hands in water -   Called 1/16 and took predniosne burst. 60mg burst and stay on 10mg a day.   Had rash -   Now beg of feb - she went to pred 5m ga dya   So she is better but still has muscle stiffness.     She's still off  mycophenolate 1500mg in am and 1000mg in pm -   She's had another cyst - and abx in mid feb -   Going to see a dermatologist -     Was taking naproxen 375mg a day - she felt better on it for her joitn pain      Wt Readings from Last 2 Encounters:   03/03/25 145 lb 12.8 oz (66.1 kg)   05/13/24 154 lb 11.2 oz (70.2 kg)     Body mass index is 28.47 kg/m².      Current Outpatient Medications   Medication Sig Dispense Refill    predniSONE 5 MG Oral Tab Take 1 tablet (5 mg total) by mouth daily. 90 tablet 1    ergocalciferol 1.25 MG (21682 UT) Oral Cap Take 1 capsule (50,000 Units total) by mouth once a week. 12 capsule 0    Mycophenolate Mofetil 500 MG Oral Tab TAKE 3 TABLETS BY MOUTH TWICE DAILY 540 tablet 1    hydroxychloroquine 200 MG Oral Tab Take 1 tablet (200 mg total) by mouth 2 (two) times daily. 180 tablet 1    predniSONE 2.5 MG Oral Tab Take 2 tablets (5 mg total) by mouth daily. (Patient not taking: Reported on 3/3/2025) 180 tablet 1    aspirin  81 MG Oral Chew Tab       Cholecalciferol (CVS D3) 2000 UNITS Oral Cap Take 1 capsule by mouth once daily. (Patient not taking: Reported on 3/3/2025) 30 capsule 1      Past Medical History:    Exophoria    ITP (idiopathic thrombocytopenic purpura)    Long-term use of Plaquenil    Lupus    Myopia of both eyes with astigmatism      History reviewed. No pertinent surgical history.   Family History   Problem Relation Age of Onset    Hypertension Mother     Other (Other) Mother     Other (obesity) Mother     Other (rheumatic fever) Mother     Diabetes Maternal Grandmother     Other (arthritis) Maternal Grandmother     Other (psoriasis) Maternal Aunt     Other (vitiligo) Maternal Aunt     Other (thyroid disease) Maternal Aunt     Glaucoma Neg     Macular degeneration Neg       Social History:  Social History     Socioeconomic History    Marital status: Single   Tobacco Use    Smoking status: Never     Passive exposure: Never    Smokeless tobacco: Never   Vaping Use    Vaping status: Never Used   Substance and Sexual Activity    Alcohol use: No    Drug use: No      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   Lives in own town house -   3 sisters - one twin with lupus      REVIEW OF SYSTEMS:   Review Of Systems:  Fatigue  Constitutional:No fever, no change in weight or appetitie  Derm: No rashes, no oral ulcers, no alopecia, no photosensitivity, no psoriasis  HEENT: No dry eyes, no dry mouth, no Raynaud's, no nasal ulcers, no parotid swelling, no neck pain, no jaw pain, no temple pain  Eyes: No visual changes,   CVS: No chest pain, no heart disease, hx of pericardial effusion - with flares gets pericarditis -   RS: No SOB, no Cough, No Pleurtic pain,  Hx of RLD - has some sob - anxiety with this - no BARNETT,   GI: No nausea, no vomiiting, no abominal pain, no hx of ulcer, no gastritis, no heartburn, no dyshpagia, no BRBPR or melena  : no dysuria, no hx of miscarriages, no DVT Hx, no hx of OCP,   Neuro: No numbness or tingling, no  headache, no hx of seizures,   Psych: no hx of anxiety or depression  ENDO: no hx of thyroid disease, no hx of DM  Joint/Muscluskeltal: see HPI,   All other ROS are negative.     EXAM:   BP (!) 172/113   Pulse 96   Resp 16   Ht 5' (1.524 m)   Wt 145 lb 12.8 oz (66.1 kg)   BMI 28.47 kg/m²   HEENT: Clear oropharynx, no oral ulcers, EOM intact, clear sclear, PERRLA, pleasant, no acute distress, no CAD,   No rashes  CVS: RRR, no murmurs  RS: CTAB, no crackles, no rhonchi  ABD: Soft Non tender, no HSM fel  Joint exam:   no neck tendnerness, good ROM,   No  joint tenderness in b/l 105th mcps and pips and b/l wrists  Left thumb slightly red at tip  Improved right 3rd pip   Not Tender to palpiaton in toes - squeeze test positive   No rash es  No edema  EXTREMITIES: no cyanosis, clubbing or edema  NEURO: intact touch, 5/5 ue and le strength    7/26/2017 - renal biopsy  A, B \T\ C: Left kidney, biopsy:   - Mesangial proliferative lupus nephritis (lupus nephritis, class II), see   note   - Mild chronic tubulointerstitial changes   =    6/10/2023   Wbc is 5.2, hb is 9.8, plt are 180  Esr is 40mm/hr.   ua is normal, trace blood no protein  c3 is 23  c4 is <8  CRP is 6.0mg/L  Ck is 52,   cmp - 0.67, ast is 14, alt is 9, total protein is 3.0  Microablumin/creatine 32  rf is <10  Ds dna is 288 - elevated    8/28/2023  ESR is 53mm/hr.   Crp is 7.5mg/L  C3 is 25 - L  C4 is <8 - L  Vit d 25 oh level is 18.5  Ds dna is > 300  Wbc is 4.6, hb is 9.1 plt are 148  Cmp - cr is 0.6, ast is 21 alt is 15  Microalbumin/cr ration is 101 -   Ua is showing 70 protein , RBC is 3-4    10/23/2023   10/23/23 12:31 PM    Sodium  133 - 146 mmol/L 134   Potassium  3.5 - 5.1 mmol/L 4.1   Chloride  98 - 107 mmol/L 99   Carbon Dioxide  21 - 31 mmol/L 26   Anion Gap  4 - 13 mmol/L 9   Blood Urea Nitrogen  7 - 25 mg/dL 8   Creatinine  0.60 - 1.30 mg/dL 0.58 Low    eGFRcr (CKD-EPI 2021)  >=60 mL/min/1.73 m² >90   Calcium  8.3 - 10.5 mg/dL 9.2    Glucose  70 - 100 mg/dL 85   Protein, Total  6.4 - 8.3 g/dL 7.3   Albumin  3.5 - 5.0 g/dL 3.9   ALT  9 - 43 units/L 13   Alkaline Phosphatase  34 - 104 units/L 65   AST  13 - 39 units/L 19   Bilirubin, Total  0.2 - 1.2 mg/dL 0.6       10/23/23 12:31 PM    C-Reactive Protein  0.0 - 10.0 mg/L 3.3     10/23/23 12:31 PM    Sedimentation Rate  (Based on documented legal sex) 0-20 mm/Hour 22 High      10/23/23 12:31 PM    Complement C3, Serum  87 - 200 mg/dL 65 Low      10/23/23 12:31 PM    WBC  3.6 - 10.2 10ˆ3/µL 7.0   RBC  (Based on documented legal sex) 4.10-5.30 10ˆ6/µL 4.33   HGB  (Based on documented legal sex) 11.9-15.8 g/dL 10.8 Low    HCT  (Based on documented legal sex) 37.4-48.3 % 35.2 Low    MCV  82.0 - 99.0 fL 81.3 Low    MCH  27.0 - 33.0 pg 24.9 Low    MCHC  32.0 - 36.0 g/dL 30.7 Low    RDW  11.0 - 15.0 % 17.2 High    PLT  150 - 450 10ˆ3/µL 387     10/23/23 12:31 PM    CK, Total  26 - 192 units/L 13 Low      10/23/23 12:31 PM    Color, Urine Light Yellow   Clarity, Urine Clear   Specific Gravity, Urine  1.005 - 1.035 1.010   pH, Urine  5.0 - 7.0 6.0   Protein, UA  Negative, 10-20 mg/dL Negative   Glucose, Urine  Negative mg/dL Normal   Ketones, Urine  Negative mg/dL Negative   Bilirubin, Urine  Negative Negative   Blood, Urine  Negative Negative   Nitrite, Urine  Negative Negative   Leukocyte Esterase, Urine  Negative Roderick/uL 500 Abnormal    Urobilinogen, Urine  Normal, <2.0 mg/dL Normal   RBC, Urine  None, 0-2 /hpf 0-2   WBC, Urine  None, 0-5 /hpf 6-9 Abnormal    Squamous Epithelial Cells, Urine  None /hpf Few Abnormal    Bacteria, Urine  None /hpf None   Hyaline Cast, Urine  None, 0-2 /lpf None   Resulting Agency [facility]     10/23/23 12:31 PM    dsDNA Antibody  0 - 4 IU/mL 106 High    dsDNA Antibody Interpretation  Negative Positive Abnormal        10/23/23 12:31 PM    Complement C4, Serum  19 - 52 mg/dL <8 Low        ASSESSMENT AND PLAN:   Riana Rivera is a 24 year old female who presents for   Chief  Complaint   Patient presents with    SLE    Medication Follow-Up     1. SLE  - with RLD, mhx of mild nephritis, hx of apls antibodies, hx of ds dna and low C3 and C4 ,  positive LAC, anti cardiolipin ig G and M, pos b2 GP Ig   Recent thrombocytopenia 72 k --> 180k---> 148 k  Hb is 9.1 -   - finsihed steroids burst - was on pred 20mg - tapered over sevral weeks and now on pred 5mg adayu   - ds dna and ESR is improved now   Hx of ITP - will need to montior - currently plta is 180k - stable   Increased leg swelling   Bad flare  -   Check labs today and will cont. Meds for now.     Cont. hcq 300mg a day   B/c ESR and ds dna is elevated   - cont.  mycnphonelate to 1500mg in am and 1500mg in pm - doing much better on thsi.   - s/p  pred to 60mg taper over 1 week -now on pred 5mg a day   - get PFTS]  - d/w her to consider benlysta or rituxan - info given on both   - she's not keen on being on steroids for along time.   rtc in 3 month -     Eye exam due in 9/2023 -     2. Vit d def - start ergocalciferol 50,000units a week x 12 weeks , then  vit d 2000units over the counter        Summary  1. Check labs in 3months.    2. Cont. hydroxychlroquine 300mg a day   3. Cont. Mycophenolate - three tablets in the monring and 2 at night  1500mg in am and 1000mg in pm    4. Cont. Prednisone. 5mg a day   5. Make appt with derm   6. Return to clinic in 3 months. 4/7 at 12:40 pm   7. Due for eye exam -     -  Consider benlysta - or rituxan      Fadia Maldonado MD  3/3/2025   5:11 PM    Addendum: 3/10/2025 -   Talked to pt. - seh still feels tail end of flare - she has no edema now - feels better than   Waking up with pain at night with pain .   Reviewed labs   Increase prednisone to 10mg a da   - also gave her info on benlysta      is applicable because the patient's medical record notes reflects the ongoing nature of the continuous longitudinal relationship of care, and the medical record indicates that there is ongoing  treatment of a serious/complex medical condition.

## 2025-03-04 LAB — DSDNA IGG SERPL IA-ACNC: 110 IU/ML

## 2025-03-10 RX ORDER — PREDNISONE 5 MG/1
10 TABLET ORAL DAILY
Qty: 180 TABLET | Refills: 1 | Status: SHIPPED | OUTPATIENT
Start: 2025-03-10

## 2025-04-07 ENCOUNTER — TELEPHONE (OUTPATIENT)
Age: 25
End: 2025-04-07

## 2025-04-07 ENCOUNTER — LAB ENCOUNTER (OUTPATIENT)
Dept: LAB | Facility: HOSPITAL | Age: 25
End: 2025-04-07
Attending: INTERNAL MEDICINE
Payer: COMMERCIAL

## 2025-04-07 ENCOUNTER — OFFICE VISIT (OUTPATIENT)
Age: 25
End: 2025-04-07
Payer: COMMERCIAL

## 2025-04-07 VITALS
HEIGHT: 60 IN | HEART RATE: 90 BPM | WEIGHT: 141.63 LBS | BODY MASS INDEX: 27.8 KG/M2 | DIASTOLIC BLOOD PRESSURE: 121 MMHG | SYSTOLIC BLOOD PRESSURE: 172 MMHG | RESPIRATION RATE: 16 BRPM

## 2025-04-07 DIAGNOSIS — Z51.81 THERAPEUTIC DRUG MONITORING: ICD-10-CM

## 2025-04-07 DIAGNOSIS — M32.9 SYSTEMIC LUPUS ERYTHEMATOSUS, UNSPECIFIED SLE TYPE, UNSPECIFIED ORGAN INVOLVEMENT STATUS (HCC): ICD-10-CM

## 2025-04-07 DIAGNOSIS — R80.9 PROTEINURIA, UNSPECIFIED TYPE: ICD-10-CM

## 2025-04-07 DIAGNOSIS — M32.9 SYSTEMIC LUPUS ERYTHEMATOSUS, UNSPECIFIED SLE TYPE, UNSPECIFIED ORGAN INVOLVEMENT STATUS (HCC): Primary | ICD-10-CM

## 2025-04-07 LAB
ALBUMIN SERPL-MCNC: 2.5 G/DL (ref 3.2–4.8)
ALBUMIN/GLOB SERPL: 0.9 {RATIO} (ref 1–2)
ALP LIVER SERPL-CCNC: 61 U/L
ALT SERPL-CCNC: 11 U/L
ANION GAP SERPL CALC-SCNC: 9 MMOL/L (ref 0–18)
AST SERPL-CCNC: 28 U/L (ref ?–34)
BILIRUB SERPL-MCNC: 0.4 MG/DL (ref 0.3–1.2)
BILIRUB UR QL: NEGATIVE
BUN BLD-MCNC: 6 MG/DL (ref 9–23)
BUN/CREAT SERPL: 9.2 (ref 10–20)
C3 SERPL-MCNC: 57.6 MG/DL (ref 82–160)
C4 SERPL-MCNC: 3.9 MG/DL (ref 12–36)
CALCIUM BLD-MCNC: 8.1 MG/DL (ref 8.7–10.4)
CHLORIDE SERPL-SCNC: 106 MMOL/L (ref 98–112)
CO2 SERPL-SCNC: 24 MMOL/L (ref 21–32)
COLOR UR: YELLOW
CREAT BLD-MCNC: 0.65 MG/DL
CREAT UR-SCNC: 128.3 MG/DL
CRP SERPL-MCNC: 0.5 MG/DL (ref ?–1)
DEPRECATED RDW RBC AUTO: 49.3 FL (ref 35.1–46.3)
EGFRCR SERPLBLD CKD-EPI 2021: 128 ML/MIN/1.73M2 (ref 60–?)
ERYTHROCYTE [DISTWIDTH] IN BLOOD BY AUTOMATED COUNT: 15.8 % (ref 11–15)
ERYTHROCYTE [SEDIMENTATION RATE] IN BLOOD: 39 MM/HR
FASTING STATUS PATIENT QL REPORTED: YES
GLOBULIN PLAS-MCNC: 2.7 G/DL (ref 2–3.5)
GLUCOSE BLD-MCNC: 88 MG/DL (ref 70–99)
GLUCOSE UR-MCNC: NORMAL MG/DL
HBV CORE AB SERPL QL IA: NONREACTIVE
HBV SURFACE AG SER-ACNC: <0.1 [IU]/L
HBV SURFACE AG SERPL QL IA: NONREACTIVE
HCT VFR BLD AUTO: 31.5 %
HCV AB SERPL QL IA: NONREACTIVE
HGB BLD-MCNC: 10.2 G/DL
HYALINE CASTS #/AREA URNS AUTO: PRESENT /LPF
KETONES UR-MCNC: NEGATIVE MG/DL
LEUKOCYTE ESTERASE UR QL STRIP.AUTO: 25
MCH RBC QN AUTO: 27.7 PG (ref 26–34)
MCHC RBC AUTO-ENTMCNC: 32.4 G/DL (ref 31–37)
MCV RBC AUTO: 85.6 FL
MICROALBUMIN UR-MCNC: 224.2 MG/DL
MICROALBUMIN/CREAT 24H UR-RTO: 1747.5 UG/MG (ref ?–30)
NITRITE UR QL STRIP.AUTO: NEGATIVE
OSMOLALITY SERPL CALC.SUM OF ELEC: 285 MOSM/KG (ref 275–295)
PH UR: 6 [PH] (ref 5–8)
PLATELET # BLD AUTO: 248 10(3)UL (ref 150–450)
POTASSIUM SERPL-SCNC: 3.4 MMOL/L (ref 3.5–5.1)
PROT SERPL-MCNC: 5.2 G/DL (ref 5.7–8.2)
PROT UR-MCNC: 300 MG/DL
RBC # BLD AUTO: 3.68 X10(6)UL
SODIUM SERPL-SCNC: 139 MMOL/L (ref 136–145)
SP GR UR STRIP: 1.02 (ref 1–1.03)
UROBILINOGEN UR STRIP-ACNC: NORMAL
WBC # BLD AUTO: 4.7 X10(3) UL (ref 4–11)

## 2025-04-07 PROCEDURE — 86160 COMPLEMENT ANTIGEN: CPT

## 2025-04-07 PROCEDURE — 86140 C-REACTIVE PROTEIN: CPT

## 2025-04-07 PROCEDURE — 36415 COLL VENOUS BLD VENIPUNCTURE: CPT | Performed by: INTERNAL MEDICINE

## 2025-04-07 PROCEDURE — 99214 OFFICE O/P EST MOD 30 MIN: CPT | Performed by: INTERNAL MEDICINE

## 2025-04-07 PROCEDURE — 82043 UR ALBUMIN QUANTITATIVE: CPT

## 2025-04-07 PROCEDURE — 86225 DNA ANTIBODY NATIVE: CPT

## 2025-04-07 PROCEDURE — 85027 COMPLETE CBC AUTOMATED: CPT

## 2025-04-07 PROCEDURE — 86704 HEP B CORE ANTIBODY TOTAL: CPT | Performed by: INTERNAL MEDICINE

## 2025-04-07 PROCEDURE — 87340 HEPATITIS B SURFACE AG IA: CPT | Performed by: INTERNAL MEDICINE

## 2025-04-07 PROCEDURE — 85652 RBC SED RATE AUTOMATED: CPT

## 2025-04-07 PROCEDURE — 81001 URINALYSIS AUTO W/SCOPE: CPT

## 2025-04-07 PROCEDURE — 82570 ASSAY OF URINE CREATININE: CPT

## 2025-04-07 PROCEDURE — 86480 TB TEST CELL IMMUN MEASURE: CPT | Performed by: INTERNAL MEDICINE

## 2025-04-07 PROCEDURE — 86803 HEPATITIS C AB TEST: CPT | Performed by: INTERNAL MEDICINE

## 2025-04-07 PROCEDURE — 80053 COMPREHEN METABOLIC PANEL: CPT

## 2025-04-07 RX ORDER — MYCOPHENOLATE MOFETIL 500 MG/1
1500 TABLET ORAL 2 TIMES DAILY
Qty: 540 TABLET | Refills: 1 | Status: SHIPPED | OUTPATIENT
Start: 2025-04-07

## 2025-04-07 NOTE — PROGRESS NOTES
Riana Rivera is a 24 year old female who presents for   Chief Complaint   Patient presents with    SLE    Medication Follow-Up    Lab Results   .   HPI:     I had the pleasure of seeing Riana Rivera on 4/21/2023 for evaluation.     She is a pleasant 22 year old who has a hx of SLE and chronic ITP. . She has a history of lupus associated with restrictive lung disease with a decreased DLCO, as well as mild renal involvement. She has . mild lupus GN, mild restrictive lung disease, and a h/o high + APLA but no h/o thrombosis. Her serology includes Lower complements.  - positive LAC, anti cardiolipin ig G and M, pos b2 GP Ig , ALFRED pos 1:!280 speckled, ds DNA pos, and hx of chronic ITP.   At age 11 years she was dx with thrombocytopenia This was ITP. In 5/2012  she had bruises, bleeding gumss, petechial rashs over her bodyShe was treated with IVIG, but had recurrence of the thrombocytopenia and was hospitalized ~ 4 times for this problem. She did receive 3 days steorids and IVIG  from 11/11/2014 to 11/15/2014. Then repeat IVIG  on 12/1/2014.  She also was treated also with oral prednisone. During a hospitalization in April 2014, she was worked up for lupus and studies revealed a high + ALFRED and dsDNA. Her parents report that she received IV methylprednisolone weekly for 5 weeks and IVIG.  She then was treated with PO prednisone for awhile. Per her parents, the prednisone was discontinued > 1 year ago without recurrence of the thrombocytopenia.       She was put on cellcept 250mg bid and prednisone in 2015 for mild protienurine - this resolved while seeing pediatric nephrology and was stopped in 2015. In 7/26/2017 she had a kidney biopsy . 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 She was admitted in 11/2017 with pericaridal effusion and then again in 11-12/2017 for mild pericardial and pleural effusion. She had anemia a that time thought to be  mulitfactorial.   She had done ekg's/echos all the time.     She was  on pred 5mg a day - - now on 2.5mg a day - 2 days -   She was up to 20mg a day around march and now tapered back down.- her platetles were low - so then gradually went down.   This is the first time she had thormbcytopenia  Again in a while.     Gets joint pain at times - , fibromyalgia as well ,   latetly no joint pain.   She is Taking baby asa , MMF 500mg bid - hcq 300mg a day -     Her next Eye exam in 9/2023 - she gets them yearly.   She lives with her twin sister in her own town house. 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   She has mild joint pain in her feet and knees.and hips.   No rashes.   No headaches.   No swellign of her legs.   She is not getting enougth mycheonolate - she is not missing any doses.     Her ESR nicreased to 40mm/hr. , her ds dnad is 288 - positive now.     8/28/2023  After her brithday - she had a huge flare up - she has dtrouble doing anything - her bones, and joints were really sore.   She still feels flare - her eyes are puffy and then her feet are swelling.   Her joint pain is mostly in her fingers , hips and knees.   It can get to 7/10   This weekend her eyes were very puffy.   She was getting low grade fevers.     In her flare - she had trouble with her throat - couldn't swallow - sore thorat. Nad voice affected.     10/25/2023  She is feeling better.   Ds dna > 300---> 106   ESR was 53mm/hr ---> 22mm/hr.   She feels the joitn pain is better.   She has mild headache. This is her usual with humiditly. 2/10 headache.   No joint pain now.   Her breathing is fine. She has to still schedule her pfts.   She tapered the prednsione from 30mg a day - and around 10mg - she had to go to 15mg a day and now she is on 5mg aday  She is doing better on this   Her mycophenolate is better as well 1500mg bid.   Eye exam in January 5/13/2024  She has  been feeling fine but she feels a slight flare up. Her left thumb is red and she feels a bad hangnail  she feels nerves in her left thumb. She increased her prednisone to 7.5mg a day and that is helping. She just started doing it last week on Wednesday. Everything else feels fine.   Her left thumb is slightly red.     3/3/2025  She had an underarm cyst - in 12/2024 - and had gone off mycophenolate and prednisone and took abx   But she was flared with her lupus after that.   She was feeling terrible - and on top of that she had two ovarian cysts   Had abd palin wnet ot ht ER on 1/17/2025 -   Started having flare at beg of jan -   She had bad pains - throughout , leg swelling,   Her hands were also burning and on fire in both her hands - all over - had to put her hands in water -   Called 1/16 and took predniosne burst. 60mg burst and stay on 10mg a day.   Had rash -   Now beg of feb - she went to pred 5m ga dya   So she is better but still has muscle stiffness.     She's still off  mycophenolate 1500mg in am and 1000mg in pm -   She's had another cyst - and abx in mid feb -   Going to see a dermatologist -     Was taking naproxen 375mg a day - she felt better on it for her joitn pain    4/7/2025  After her last visit -I talked to her on 3/10/2025 =- seh still feels tail end of flare - she has no edema now - feels better than before.   But she was still Waking up with pain at night with pain . , Increase prednisone to 10mg a da   - also gave her info on benlysta   She is feeling a little better. She went down from pred 10mg a day - after last month's flare - to 5mg at the end of march.   She doenst' feel her bones hurt as much. She doesn't have the swellign in her legs.   She overall is better   She has healed our skin     Wt Readings from Last 2 Encounters:   04/07/25 141 lb 9.6 oz (64.2 kg)   03/03/25 145 lb 12.8 oz (66.1 kg)     Body mass index is 27.65 kg/m².      Current Outpatient Medications   Medication Sig  Dispense Refill    predniSONE 5 MG Oral Tab Take 2 tablets (10 mg total) by mouth daily. 180 tablet 1    hydroxychloroquine 200 MG Oral Tab Take 200mg alternating with 400mg daily - MWF 200mg and TThuSatSun 400mg 135 tablet 3    ergocalciferol 1.25 MG (16359 UT) Oral Cap Take 1 capsule (50,000 Units total) by mouth once a week. 12 capsule 0    Mycophenolate Mofetil 500 MG Oral Tab TAKE 3 TABLETS BY MOUTH TWICE DAILY 540 tablet 1    predniSONE 2.5 MG Oral Tab Take 2 tablets (5 mg total) by mouth daily. (Patient not taking: Reported on 10/25/2023) 180 tablet 1    aspirin 81 MG Oral Chew Tab       Cholecalciferol ([employer] D3) 2000 UNITS Oral Cap Take 1 capsule by mouth once daily. (Patient not taking: Reported on 5/13/2024) 30 capsule 1      Past Medical History:    Exophoria    ITP (idiopathic thrombocytopenic purpura)    Long-term use of Plaquenil    Lupus    Myopia of both eyes with astigmatism      History reviewed. No pertinent surgical history.   Family History   Problem Relation Age of Onset    Hypertension Mother     Other (Other) Mother     Other (obesity) Mother     Other (rheumatic fever) Mother     Diabetes Maternal Grandmother     Other (arthritis) Maternal Grandmother     Other (psoriasis) Maternal Aunt     Other (vitiligo) Maternal Aunt     Other (thyroid disease) Maternal Aunt     Glaucoma Neg     Macular degeneration Neg       Social History:  Social History     Socioeconomic History    Marital status: Single   Tobacco Use    Smoking status: Never     Passive exposure: Never    Smokeless tobacco: Never   Vaping Use    Vaping status: Never Used   Substance and Sexual Activity    Alcohol use: No    Drug use: No      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   Lives in own town house -   3 sisters - one twin with lupus      REVIEW OF SYSTEMS:   Review Of Systems:  Fatigue  Constitutional:No fever, no change in weight or appetitie  Derm: No rashes, no oral ulcers, no alopecia, no photosensitivity, no psoriasis  HEENT: No  dry eyes, no dry mouth, no Raynaud's, no nasal ulcers, no parotid swelling, no neck pain, no jaw pain, no temple pain  Eyes: No visual changes,   CVS: No chest pain, no heart disease, hx of pericardial effusion - with flares gets pericarditis -   RS: No SOB, no Cough, No Pleurtic pain,  Hx of RLD - has some sob - anxiety with this - no BARNETT,   GI: No nausea, no vomiiting, no abominal pain, no hx of ulcer, no gastritis, no heartburn, no dyshpagia, no BRBPR or melena  : no dysuria, no hx of miscarriages, no DVT Hx, no hx of OCP,   Neuro: No numbness or tingling, no headache, no hx of seizures,   Psych: no hx of anxiety or depression  ENDO: no hx of thyroid disease, no hx of DM  Joint/Muscluskeltal: see HPI,   All other ROS are negative.     EXAM:   BP (!) 172/121   Pulse 90   Resp 16   Ht 5' (1.524 m)   Wt 141 lb 9.6 oz (64.2 kg)   BMI 27.65 kg/m²   HEENT: Clear oropharynx, no oral ulcers, EOM intact, clear sclear, PERRLA, pleasant, no acute distress, no CAD,   No rashes  CVS: RRR, no murmurs  RS: CTAB, no crackles, no rhonchi  ABD: Soft Non tender, no HSM fel  Joint exam:   no neck tendnerness, good ROM,   No  joint tenderness in b/l 105th mcps and pips and b/l wrists  Left thumb slightly red at tip  Improved right 3rd pip   Not Tender to palpiaton in toes - squeeze test positive   No rash es  No edema  EXTREMITIES: no cyanosis, clubbing or edema  NEURO: intact touch, 5/5 ue and le strength    7/26/2017 - renal biopsy  A, B \T\ C: Left kidney, biopsy:   - Mesangial proliferative lupus nephritis (lupus nephritis, class II), see   note   - Mild chronic tubulointerstitial changes   =    6/10/2023   Wbc is 5.2, hb is 9.8, plt are 180  Esr is 40mm/hr.   ua is normal, trace blood no protein  c3 is 23  c4 is <8  CRP is 6.0mg/L  Ck is 52,   cmp - 0.67, ast is 14, alt is 9, total protein is 3.0  Microablumin/creatine 32  rf is <10  Ds dna is 288 - elevated    8/28/2023  ESR is 53mm/hr.   Crp is 7.5mg/L  C3 is 25 - L  C4  is <8 - L  Vit d 25 oh level is 18.5  Ds dna is > 300  Wbc is 4.6, hb is 9.1 plt are 148  Cmp - cr is 0.6, ast is 21 alt is 15  Microalbumin/cr ration is 101 -   Ua is showing 70 protein , RBC is 3-4    Component      Latest Ref Rng 3/3/2025   Glucose      70 - 99 mg/dL 95    Sodium      136 - 145 mmol/L 142    Potassium      3.5 - 5.1 mmol/L 3.8    Chloride      98 - 112 mmol/L 109    Carbon Dioxide, Total      21.0 - 32.0 mmol/L 26.0    ANION GAP      0 - 18 mmol/L 7    BUN      9 - 23 mg/dL 10    CREATININE      0.55 - 1.02 mg/dL 0.66    BUN/CREATININE RATIO      10.0 - 20.0  15.2    CALCIUM      8.7 - 10.4 mg/dL 8.1 (L)    CALCULATED OSMOLALITY      275 - 295 mOsm/kg 293    EGFR      >=60 mL/min/1.73m2 127    ALT (SGPT)      10 - 49 U/L 12    AST (SGOT)      <34 U/L 29    ALKALINE PHOSPHATASE      52 - 144 U/L 85    Total Bilirubin      0.3 - 1.2 mg/dL 0.4    PROTEIN, TOTAL      5.7 - 8.2 g/dL 6.0    Albumin      3.2 - 4.8 g/dL 2.6 (L)    Globulin      2.0 - 3.5 g/dL 3.4    A/G Ratio      1.0 - 2.0  0.8 (L)    Patient Fasting for CMP? No    Color Urine      Yellow  Yellow    Clarity Urine      Clear  Turbid !    Spec Gravity      1.005 - 1.030  1.023    Glucose Urine      Normal mg/dL Normal    Bilirubin Urine      Negative  Negative    Ketones, UA      Negative mg/dL Negative    Blood Urine      Negative  2+ !    PH Urine      5.0 - 8.0  7.0    Protein Urine      Negative mg/dL 300 !    Urobilinogen Urine      Normal  Normal    Nitrite Urine      Negative  Negative    Leukocyte Esterase       Negative  25 !    WBC Urine      0 - 5 /HPF 6-10 !    RBC Urine      0 - 2 /HPF >10 !    Bacteria Urine      None Seen /HPF Rare !    SQUAM EPI CELLS UR      None Seen /HPF Few !    RENAL TUBULAR EPITHELIAL CELLS      None Seen /HPF None Seen    TRANSITIONAL EPI CELLS      None Seen /HPF Few !    HYALINE CASTS      None Seen /LPF Present !    CELLULAR CAST      None Seen /LPF Present !    YEAST URINE      None Seen /HPF  None Seen    WBC      4.0 - 11.0 x10(3) uL 5.9    RBC      3.80 - 5.30 x10(6)uL 3.46 (L)    Hemoglobin      12.0 - 16.0 g/dL 9.7 (L)    Hematocrit      35.0 - 48.0 % 29.2 (L)    MCV      80.0 - 100.0 fL 84.4    MCH      26.0 - 34.0 pg 28.0    MCHC      31.0 - 37.0 g/dL 33.2    RDW      11.0 - 15.0 % 14.8    RDW-SD      35.1 - 46.3 fL 45.1    Platelet Count      150.0 - 450.0 10(3)uL 153.0    MALB URINE      mg/dL >380.00    CREATININE UR RANDOM      mg/dL 138.80    C-REACTIVE PROTEIN      <1.00 mg/dL 2.40 (H)    SED RATE      0 - 20 mm/Hr 47 (H)    COMPLEMENT C4      12.0 - 36.0 mg/dL 1.9 (L)    COMPLEMENT C3      82.0 - 160.0 mg/dL 48.0 (L)    CK       U/L U/L <15 (L)    Anti-dsDNA antibody      <10 IU/mL 110.0 (H)       Legend:  (L) Low  ! Abnormal  (H) High      ASSESSMENT AND PLAN:   Riana Rivera is a 24 year old female who presents for   Chief Complaint   Patient presents with    SLE    Medication Follow-Up    Lab Results     1. SLE  - with RLD, mhx of mild nephritis, hx of apls antibodies, hx of ds dna and low C3 and C4 ,  positive LAC, anti cardiolipin ig G and M, pos b2 GP Ig   Recent thrombocytopenia 72 k --> 180k---> 148 k--> 153k  Hb is 9.1 -   - finsihed steroids burst - was on pred 20mg - tapered over sevral weeks and now on pred 5mg adayu   - ds dna and ESR is improved now   Hx of ITP - will need to montior - currently plta is 180k - stable   Increased leg swelling   Bad flare  -   Check labs today and will cont. Meds for now.     Cont. hcq 300mg a day   B/c ESR and ds dna is elevated   - cont.  mycnphonelate to 1500mg in am and 1500mg in pm - doing much better on thsi.   - s/p  pred to 60mg taper over 1 week -now on pred 5mg a day   - get PFTS]  - d/w her to consider benlysta or rituxan - info given on both   - she's not keen on being on steroids for along time.   rtc in 3 month -     Eye exam due in 9/2023 -     2. Vit d def - start ergocalciferol 50,000units a week x 12 weeks , then  vit d  2000units over the counter        Summary  1. Check labs today and then  in 3months.    2. Cont. hydroxychlroquine 300mg a day   3. Cont. Mycophenolate - increase three tablets in the monring and 3 at night  1500mg in am and 1500mg in pm    4. Cont. Prednisone. 5mg a day   5. Make appt with kidney doctor - [phone] - dr. Bansal, dr. Goncalves,   6. Return to clinic in 3 months. - 6 months -   7. Due for eye exam - OCT   8. Plan for benlysta if proteinuria persists    -  Consider benlysta - or rituxan      Fadia Maldonado MD  4/7/2025       is applicable because the patient's medical record notes reflects the ongoing nature of the continuous longitudinal relationship of care, and the medical record indicates that there is ongoing treatment of a serious/complex medical condition.

## 2025-04-07 NOTE — TELEPHONE ENCOUNTER
Plan for benlysta 200mg a week - for lupus /nephritis - will need prior auth0- she is getting labs today

## 2025-04-07 NOTE — PATIENT INSTRUCTIONS
1. Check labs today and then  in 3months.    2. Cont. hydroxychlroquine 300mg a day   3. Cont. Mycophenolate - increase three tablets in the monring and 3 at night  1500mg in am and 1500mg in pm    4. Cont. Prednisone. 5mg a day   5. Make appt with kidney doctor - [phone] - dr. Bansal, dr. Goncalves,   6. Return to clinic in 3 months. - 6 months -   7. Due for eye exam - OCT   8. Plan for benlysta if proteinuria persists

## 2025-04-08 LAB — DSDNA IGG SERPL IA-ACNC: 54.6 IU/ML

## 2025-04-09 LAB
M TB IFN-G CD4+ T-CELLS BLD-ACNC: 0.08 IU/ML
M TB TUBERC IGNF/MITOGEN IGNF CONTROL: 0.23 IU/ML
QFT TB1 AG MINUS NIL: 0 IU/ML
QFT TB2 AG MINUS NIL: 0.01 IU/ML

## 2025-04-15 NOTE — TELEPHONE ENCOUNTER
PA start    Prior authorization for: Benlysta    Medication form: 200 mg pen     Submission method: WorkSimple    Tracking #:    [id number] result: CXR needed    Component      Latest Ref Rng 4/7/2025   Quantiferon TB NIL      IU/mL 0.08    Quantiferon-TB1 Minus NIL      IU/mL 0.00    Quantiferon-TB2 Minus NIL      IU/mL 0.01    Quantiferon TB Mitogen minus NIL      IU/mL 0.23    Quantiferon TB Result      Negative  Indeterminate    HBSAg Screen      Nonreactive   Nonreactive    Hbsag Screen Index <0.10    HEPATITIS B CORE AB, TOTAL      Nonreactive   Nonreactive    HCV AB      Nonreactive   Nonreactive

## 2025-04-17 RX ORDER — BELIMUMAB 200 MG/ML
200 SOLUTION SUBCUTANEOUS WEEKLY
Qty: 4 EACH | Refills: 5 | Status: SHIPPED | OUTPATIENT
Start: 2025-04-17

## 2025-04-17 NOTE — TELEPHONE ENCOUNTER
Script pended for review. Will pt require teaching?    PA Approved    Prior authorization for: Benlysta    Medication form: 200 mg pen     Approval #: [id number]    Approved dates: until 4/15/2026    Pharmacy for medication: [facility]

## 2025-04-22 ENCOUNTER — TELEPHONE (OUTPATIENT)
Age: 25
End: 2025-04-22

## 2025-04-22 NOTE — TELEPHONE ENCOUNTER
Current Outpatient Medications   Medication Sig Dispense Refill    Belimumab (BENLYSTA) 200 MG/ML Subcutaneous Solution Auto-injector Inject 200 mg into the skin once a week. 4 each 5         This is the first time [facility] is filling this medication. Please submit a rx for the starter dose if appropriate.

## 2025-04-24 NOTE — TELEPHONE ENCOUNTER
Name and  verified. Pt stated she was expecting an infusion and not a self-injectable. Please advise.

## 2025-04-25 ENCOUNTER — MOBILE ENCOUNTER (OUTPATIENT)
Dept: RHEUMATOLOGY | Facility: CLINIC | Age: 25
End: 2025-04-25

## 2025-04-25 NOTE — TELEPHONE ENCOUNTER
We did discuss this - could we put orders in for IV benlysta  Initial: 10 mg/kg every 2 weeks for 3 doses; Maintenance: 10 mg/kg every 4 weeks   Will wait for prior auth from our infusion team

## 2025-04-29 NOTE — TELEPHONE ENCOUNTER
Good morning,     Verified coverage, Benlysta infusion has been approved by [facility].   Authorization:[id number]  Valid from: 04/29/25---04/29/26      Santiago to schedule patient.      Thank You,  Harriet

## 2025-07-17 ENCOUNTER — TELEPHONE (OUTPATIENT)
Age: 25
End: 2025-07-17

## 2025-07-22 ENCOUNTER — OFFICE VISIT (OUTPATIENT)
Facility: LOCATION | Age: 25
End: 2025-07-22
Attending: INTERNAL MEDICINE

## 2025-07-22 ENCOUNTER — TELEPHONE (OUTPATIENT)
Age: 25
End: 2025-07-22

## 2025-07-22 VITALS
BODY MASS INDEX: 28 KG/M2 | DIASTOLIC BLOOD PRESSURE: 96 MMHG | RESPIRATION RATE: 18 BRPM | OXYGEN SATURATION: 100 % | HEART RATE: 86 BPM | SYSTOLIC BLOOD PRESSURE: 151 MMHG | WEIGHT: 143.31 LBS | TEMPERATURE: 98 F

## 2025-08-05 ENCOUNTER — OFFICE VISIT (OUTPATIENT)
Facility: LOCATION | Age: 25
End: 2025-08-05
Attending: INTERNAL MEDICINE

## 2025-08-05 VITALS
SYSTOLIC BLOOD PRESSURE: 155 MMHG | DIASTOLIC BLOOD PRESSURE: 107 MMHG | WEIGHT: 140.69 LBS | TEMPERATURE: 98 F | BODY MASS INDEX: 27 KG/M2 | HEART RATE: 99 BPM | RESPIRATION RATE: 18 BRPM | OXYGEN SATURATION: 100 %

## 2025-08-05 DIAGNOSIS — M32.9 SYSTEMIC LUPUS ERYTHEMATOSUS, UNSPECIFIED SLE TYPE, UNSPECIFIED ORGAN INVOLVEMENT STATUS (HCC): Primary | ICD-10-CM

## 2025-08-05 RX ORDER — ACETAMINOPHEN 325 MG/1
650 TABLET ORAL ONCE
OUTPATIENT
Start: 2025-08-19 | End: 2025-08-19

## 2025-08-05 RX ORDER — ACETAMINOPHEN 325 MG/1
650 TABLET ORAL ONCE
Status: COMPLETED | OUTPATIENT
Start: 2025-08-05 | End: 2025-08-05

## 2025-08-05 RX ORDER — ACETAMINOPHEN 325 MG/1
TABLET ORAL
Status: COMPLETED
Start: 2025-08-05 | End: 2025-08-05

## 2025-08-05 RX ORDER — DIPHENHYDRAMINE HCL 25 MG
CAPSULE ORAL
Status: COMPLETED
Start: 2025-08-05 | End: 2025-08-05

## 2025-08-05 RX ORDER — DIPHENHYDRAMINE HCL 25 MG
25 CAPSULE ORAL ONCE
OUTPATIENT
Start: 2025-08-19

## 2025-08-05 RX ORDER — DIPHENHYDRAMINE HCL 25 MG
25 CAPSULE ORAL ONCE
Status: COMPLETED | OUTPATIENT
Start: 2025-08-05 | End: 2025-08-05

## 2025-08-05 RX ADMIN — ACETAMINOPHEN 650 MG: 325 TABLET ORAL at 09:35:00

## 2025-08-05 RX ADMIN — DIPHENHYDRAMINE HCL 25 MG: 25 MG CAPSULE ORAL at 09:35:00

## 2025-08-19 ENCOUNTER — OFFICE VISIT (OUTPATIENT)
Facility: LOCATION | Age: 25
End: 2025-08-19
Attending: INTERNAL MEDICINE

## 2025-08-19 VITALS
TEMPERATURE: 99 F | SYSTOLIC BLOOD PRESSURE: 147 MMHG | OXYGEN SATURATION: 100 % | HEART RATE: 92 BPM | BODY MASS INDEX: 27 KG/M2 | RESPIRATION RATE: 18 BRPM | WEIGHT: 139.81 LBS | DIASTOLIC BLOOD PRESSURE: 99 MMHG

## 2025-08-19 DIAGNOSIS — M32.9 SYSTEMIC LUPUS ERYTHEMATOSUS, UNSPECIFIED SLE TYPE, UNSPECIFIED ORGAN INVOLVEMENT STATUS (HCC): Primary | ICD-10-CM

## 2025-08-19 RX ORDER — ACETAMINOPHEN 325 MG/1
650 TABLET ORAL ONCE
Status: COMPLETED | OUTPATIENT
Start: 2025-08-19 | End: 2025-08-19

## 2025-08-19 RX ORDER — DIPHENHYDRAMINE HCL 25 MG
25 CAPSULE ORAL ONCE
Status: COMPLETED | OUTPATIENT
Start: 2025-08-19 | End: 2025-08-19

## 2025-08-19 RX ORDER — ACETAMINOPHEN 325 MG/1
650 TABLET ORAL ONCE
OUTPATIENT
Start: 2025-09-02 | End: 2025-09-02

## 2025-08-19 RX ORDER — DIPHENHYDRAMINE HCL 25 MG
CAPSULE ORAL
Status: COMPLETED
Start: 2025-08-19 | End: 2025-08-19

## 2025-08-19 RX ORDER — ACETAMINOPHEN 325 MG/1
TABLET ORAL
Status: COMPLETED
Start: 2025-08-19 | End: 2025-08-19

## 2025-08-19 RX ORDER — DIPHENHYDRAMINE HCL 25 MG
25 CAPSULE ORAL ONCE
OUTPATIENT
Start: 2025-09-02

## 2025-08-19 RX ADMIN — ACETAMINOPHEN 650 MG: 325 TABLET ORAL at 10:24:00

## 2025-08-19 RX ADMIN — DIPHENHYDRAMINE HCL 25 MG: 25 MG CAPSULE ORAL at 09:35:00
